# Patient Record
Sex: MALE | Race: BLACK OR AFRICAN AMERICAN | NOT HISPANIC OR LATINO | URBAN - METROPOLITAN AREA
[De-identification: names, ages, dates, MRNs, and addresses within clinical notes are randomized per-mention and may not be internally consistent; named-entity substitution may affect disease eponyms.]

---

## 2022-05-26 ENCOUNTER — INPATIENT (INPATIENT)
Facility: HOSPITAL | Age: 77
LOS: 1 days | Discharge: HOME | DRG: 252 | End: 2022-05-28
Attending: STUDENT IN AN ORGANIZED HEALTH CARE EDUCATION/TRAINING PROGRAM | Admitting: STUDENT IN AN ORGANIZED HEALTH CARE EDUCATION/TRAINING PROGRAM
Payer: MEDICARE

## 2022-05-26 VITALS
HEIGHT: 65 IN | WEIGHT: 162.04 LBS | HEART RATE: 59 BPM | SYSTOLIC BLOOD PRESSURE: 129 MMHG | OXYGEN SATURATION: 97 % | DIASTOLIC BLOOD PRESSURE: 69 MMHG | TEMPERATURE: 97 F | RESPIRATION RATE: 18 BRPM

## 2022-05-26 DIAGNOSIS — I77.0 ARTERIOVENOUS FISTULA, ACQUIRED: Chronic | ICD-10-CM

## 2022-05-26 DIAGNOSIS — Z41.9 ENCOUNTER FOR PROCEDURE FOR PURPOSES OTHER THAN REMEDYING HEALTH STATE, UNSPECIFIED: Chronic | ICD-10-CM

## 2022-05-26 LAB
ALBUMIN SERPL ELPH-MCNC: 4.9 G/DL — SIGNIFICANT CHANGE UP (ref 3.3–5)
ALP SERPL-CCNC: 99 U/L — SIGNIFICANT CHANGE UP (ref 40–120)
ALT FLD-CCNC: 9 U/L — LOW (ref 10–45)
ANION GAP SERPL CALC-SCNC: 18 MMOL/L — HIGH (ref 5–17)
ANION GAP SERPL CALC-SCNC: 19 MMOL/L — HIGH (ref 5–17)
APTT BLD: 29.8 SEC — SIGNIFICANT CHANGE UP (ref 27.5–35.5)
AST SERPL-CCNC: 15 U/L — SIGNIFICANT CHANGE UP (ref 10–40)
BASOPHILS # BLD AUTO: 0.04 K/UL — SIGNIFICANT CHANGE UP (ref 0–0.2)
BASOPHILS NFR BLD AUTO: 0.4 % — SIGNIFICANT CHANGE UP (ref 0–2)
BILIRUB SERPL-MCNC: 0.3 MG/DL — SIGNIFICANT CHANGE UP (ref 0.2–1.2)
BLD GP AB SCN SERPL QL: NEGATIVE — SIGNIFICANT CHANGE UP
BLD GP AB SCN SERPL QL: NEGATIVE — SIGNIFICANT CHANGE UP
BUN SERPL-MCNC: 40 MG/DL — HIGH (ref 7–23)
BUN SERPL-MCNC: 53 MG/DL — HIGH (ref 7–23)
CALCIUM SERPL-MCNC: 8.5 MG/DL — SIGNIFICANT CHANGE UP (ref 8.4–10.5)
CALCIUM SERPL-MCNC: 8.7 MG/DL — SIGNIFICANT CHANGE UP (ref 8.4–10.5)
CHLORIDE SERPL-SCNC: 90 MMOL/L — LOW (ref 96–108)
CHLORIDE SERPL-SCNC: 92 MMOL/L — LOW (ref 96–108)
CO2 SERPL-SCNC: 29 MMOL/L — SIGNIFICANT CHANGE UP (ref 22–31)
CO2 SERPL-SCNC: 31 MMOL/L — SIGNIFICANT CHANGE UP (ref 22–31)
CREAT SERPL-MCNC: 8.39 MG/DL — HIGH (ref 0.5–1.3)
CREAT SERPL-MCNC: 9.22 MG/DL — HIGH (ref 0.5–1.3)
EGFR: 5 ML/MIN/1.73M2 — LOW
EGFR: 6 ML/MIN/1.73M2 — LOW
EOSINOPHIL # BLD AUTO: 0.04 K/UL — SIGNIFICANT CHANGE UP (ref 0–0.5)
EOSINOPHIL NFR BLD AUTO: 0.4 % — SIGNIFICANT CHANGE UP (ref 0–6)
GLUCOSE SERPL-MCNC: 70 MG/DL — SIGNIFICANT CHANGE UP (ref 70–99)
GLUCOSE SERPL-MCNC: 87 MG/DL — SIGNIFICANT CHANGE UP (ref 70–99)
HCT VFR BLD CALC: 45.3 % — SIGNIFICANT CHANGE UP (ref 39–50)
HCT VFR BLD CALC: 45.6 % — SIGNIFICANT CHANGE UP (ref 39–50)
HGB BLD-MCNC: 14.2 G/DL — SIGNIFICANT CHANGE UP (ref 13–17)
HGB BLD-MCNC: 14.5 G/DL — SIGNIFICANT CHANGE UP (ref 13–17)
IMM GRANULOCYTES NFR BLD AUTO: 0.3 % — SIGNIFICANT CHANGE UP (ref 0–1.5)
INR BLD: 1.06 — SIGNIFICANT CHANGE UP (ref 0.88–1.16)
LYMPHOCYTES # BLD AUTO: 1.43 K/UL — SIGNIFICANT CHANGE UP (ref 1–3.3)
LYMPHOCYTES # BLD AUTO: 15.6 % — SIGNIFICANT CHANGE UP (ref 13–44)
MAGNESIUM SERPL-MCNC: 2 MG/DL — SIGNIFICANT CHANGE UP (ref 1.6–2.6)
MCHC RBC-ENTMCNC: 28 PG — SIGNIFICANT CHANGE UP (ref 27–34)
MCHC RBC-ENTMCNC: 28.3 PG — SIGNIFICANT CHANGE UP (ref 27–34)
MCHC RBC-ENTMCNC: 31.1 GM/DL — LOW (ref 32–36)
MCHC RBC-ENTMCNC: 32 GM/DL — SIGNIFICANT CHANGE UP (ref 32–36)
MCV RBC AUTO: 88.3 FL — SIGNIFICANT CHANGE UP (ref 80–100)
MCV RBC AUTO: 89.8 FL — SIGNIFICANT CHANGE UP (ref 80–100)
MONOCYTES # BLD AUTO: 0.82 K/UL — SIGNIFICANT CHANGE UP (ref 0–0.9)
MONOCYTES NFR BLD AUTO: 8.9 % — SIGNIFICANT CHANGE UP (ref 2–14)
NEUTROPHILS # BLD AUTO: 6.83 K/UL — SIGNIFICANT CHANGE UP (ref 1.8–7.4)
NEUTROPHILS NFR BLD AUTO: 74.4 % — SIGNIFICANT CHANGE UP (ref 43–77)
NRBC # BLD: 0 /100 WBCS — SIGNIFICANT CHANGE UP (ref 0–0)
NRBC # BLD: 0 /100 WBCS — SIGNIFICANT CHANGE UP (ref 0–0)
PHOSPHATE SERPL-MCNC: 4.7 MG/DL — HIGH (ref 2.5–4.5)
PLATELET # BLD AUTO: 188 K/UL — SIGNIFICANT CHANGE UP (ref 150–400)
PLATELET # BLD AUTO: 213 K/UL — SIGNIFICANT CHANGE UP (ref 150–400)
POTASSIUM SERPL-MCNC: 3.7 MMOL/L — SIGNIFICANT CHANGE UP (ref 3.5–5.3)
POTASSIUM SERPL-MCNC: 4.7 MMOL/L — SIGNIFICANT CHANGE UP (ref 3.5–5.3)
POTASSIUM SERPL-SCNC: 3.7 MMOL/L — SIGNIFICANT CHANGE UP (ref 3.5–5.3)
POTASSIUM SERPL-SCNC: 4.7 MMOL/L — SIGNIFICANT CHANGE UP (ref 3.5–5.3)
PROT SERPL-MCNC: 7.8 G/DL — SIGNIFICANT CHANGE UP (ref 6–8.3)
PROTHROM AB SERPL-ACNC: 12.6 SEC — SIGNIFICANT CHANGE UP (ref 10.5–13.4)
RBC # BLD: 5.08 M/UL — SIGNIFICANT CHANGE UP (ref 4.2–5.8)
RBC # BLD: 5.13 M/UL — SIGNIFICANT CHANGE UP (ref 4.2–5.8)
RBC # FLD: 15.9 % — HIGH (ref 10.3–14.5)
RBC # FLD: 16 % — HIGH (ref 10.3–14.5)
RH IG SCN BLD-IMP: POSITIVE — SIGNIFICANT CHANGE UP
RH IG SCN BLD-IMP: POSITIVE — SIGNIFICANT CHANGE UP
SARS-COV-2 RNA SPEC QL NAA+PROBE: NEGATIVE — SIGNIFICANT CHANGE UP
SODIUM SERPL-SCNC: 139 MMOL/L — SIGNIFICANT CHANGE UP (ref 135–145)
SODIUM SERPL-SCNC: 140 MMOL/L — SIGNIFICANT CHANGE UP (ref 135–145)
WBC # BLD: 8.88 K/UL — SIGNIFICANT CHANGE UP (ref 3.8–10.5)
WBC # BLD: 9.19 K/UL — SIGNIFICANT CHANGE UP (ref 3.8–10.5)
WBC # FLD AUTO: 8.88 K/UL — SIGNIFICANT CHANGE UP (ref 3.8–10.5)
WBC # FLD AUTO: 9.19 K/UL — SIGNIFICANT CHANGE UP (ref 3.8–10.5)

## 2022-05-26 PROCEDURE — 99285 EMERGENCY DEPT VISIT HI MDM: CPT

## 2022-05-26 PROCEDURE — 71046 X-RAY EXAM CHEST 2 VIEWS: CPT | Mod: 26

## 2022-05-26 RX ORDER — METOPROLOL TARTRATE 50 MG
1 TABLET ORAL
Qty: 0 | Refills: 0 | DISCHARGE

## 2022-05-26 RX ORDER — PREGABALIN 225 MG/1
1 CAPSULE ORAL
Qty: 0 | Refills: 0 | DISCHARGE

## 2022-05-26 RX ORDER — NIFEDIPINE 30 MG
90 TABLET, EXTENDED RELEASE 24 HR ORAL DAILY
Refills: 0 | Status: DISCONTINUED | OUTPATIENT
Start: 2022-05-26 | End: 2022-05-27

## 2022-05-26 RX ORDER — FOLIC ACID 0.8 MG
1 TABLET ORAL DAILY
Refills: 0 | Status: DISCONTINUED | OUTPATIENT
Start: 2022-05-26 | End: 2022-05-27

## 2022-05-26 RX ORDER — PREGABALIN 225 MG/1
1000 CAPSULE ORAL DAILY
Refills: 0 | Status: DISCONTINUED | OUTPATIENT
Start: 2022-05-26 | End: 2022-05-27

## 2022-05-26 RX ORDER — HEPARIN SODIUM 5000 [USP'U]/ML
5000 INJECTION INTRAVENOUS; SUBCUTANEOUS EVERY 12 HOURS
Refills: 0 | Status: DISCONTINUED | OUTPATIENT
Start: 2022-05-26 | End: 2022-05-27

## 2022-05-26 RX ORDER — METOPROLOL TARTRATE 50 MG
50 TABLET ORAL DAILY
Refills: 0 | Status: DISCONTINUED | OUTPATIENT
Start: 2022-05-26 | End: 2022-05-27

## 2022-05-26 RX ORDER — CALCIUM ACETATE 667 MG
2 TABLET ORAL
Qty: 0 | Refills: 0 | DISCHARGE

## 2022-05-26 RX ORDER — OLMESARTAN MEDOXOMIL 5 MG/1
1 TABLET, FILM COATED ORAL
Qty: 0 | Refills: 0 | DISCHARGE

## 2022-05-26 RX ORDER — APIXABAN 2.5 MG/1
1 TABLET, FILM COATED ORAL
Qty: 0 | Refills: 0 | DISCHARGE

## 2022-05-26 RX ORDER — ATORVASTATIN CALCIUM 80 MG/1
1 TABLET, FILM COATED ORAL
Qty: 0 | Refills: 0 | DISCHARGE

## 2022-05-26 RX ORDER — FLUTICASONE FUROATE, UMECLIDINIUM BROMIDE AND VILANTEROL TRIFENATATE 200; 62.5; 25 UG/1; UG/1; UG/1
1 POWDER RESPIRATORY (INHALATION)
Qty: 0 | Refills: 0 | DISCHARGE

## 2022-05-26 RX ORDER — CALCIUM ACETATE 667 MG
1334 TABLET ORAL
Refills: 0 | Status: DISCONTINUED | OUTPATIENT
Start: 2022-05-26 | End: 2022-05-27

## 2022-05-26 RX ORDER — ASPIRIN/CALCIUM CARB/MAGNESIUM 324 MG
81 TABLET ORAL DAILY
Refills: 0 | Status: DISCONTINUED | OUTPATIENT
Start: 2022-05-26 | End: 2022-05-27

## 2022-05-26 RX ORDER — HEPARIN SODIUM 5000 [USP'U]/ML
5000 INJECTION INTRAVENOUS; SUBCUTANEOUS EVERY 8 HOURS
Refills: 0 | Status: DISCONTINUED | OUTPATIENT
Start: 2022-05-26 | End: 2022-05-26

## 2022-05-26 RX ORDER — FOLIC ACID 0.8 MG
1 TABLET ORAL
Qty: 0 | Refills: 0 | DISCHARGE

## 2022-05-26 RX ORDER — BUDESONIDE AND FORMOTEROL FUMARATE DIHYDRATE 160; 4.5 UG/1; UG/1
2 AEROSOL RESPIRATORY (INHALATION)
Refills: 0 | Status: DISCONTINUED | OUTPATIENT
Start: 2022-05-26 | End: 2022-05-27

## 2022-05-26 RX ORDER — ATORVASTATIN CALCIUM 80 MG/1
20 TABLET, FILM COATED ORAL AT BEDTIME
Refills: 0 | Status: DISCONTINUED | OUTPATIENT
Start: 2022-05-26 | End: 2022-05-27

## 2022-05-26 RX ORDER — TIOTROPIUM BROMIDE 18 UG/1
1 CAPSULE ORAL; RESPIRATORY (INHALATION) DAILY
Refills: 0 | Status: DISCONTINUED | OUTPATIENT
Start: 2022-05-26 | End: 2022-05-27

## 2022-05-26 RX ADMIN — HEPARIN SODIUM 5000 UNIT(S): 5000 INJECTION INTRAVENOUS; SUBCUTANEOUS at 19:54

## 2022-05-26 RX ADMIN — Medication 1334 MILLIGRAM(S): at 17:29

## 2022-05-26 RX ADMIN — ATORVASTATIN CALCIUM 20 MILLIGRAM(S): 80 TABLET, FILM COATED ORAL at 22:14

## 2022-05-26 NOTE — ED PROVIDER NOTE - OBJECTIVE STATEMENT
77 yom sent here from vascular for admission.  noted by Dr walker to have an ulceration in his left AVF.  Pt completed dialysis yesterday.  no current complaints

## 2022-05-26 NOTE — H&P ADULT - NSICDXPASTSURGICALHX_GEN_ALL_CORE_FT
PAST SURGICAL HISTORY:  Elective surgery right eye surgery    Left femoral arteriovenous fistula

## 2022-05-26 NOTE — ED ADULT NURSE NOTE - OBJECTIVE STATEMENT
77y male sent in for vascular admission. MWF dialysis. received full session yesterday. states that his doctor sent him in for L fistula repair because of ulceration on L fistula. open skin noted to L forearm. 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 no other complaints. denies fever/chills, n/v/d, headache, numbness/tingling, CP. 20G R ac placed. preop labs sent.

## 2022-05-26 NOTE — H&P ADULT - NSHPLABSRESULTS_GEN_ALL_CORE
14.5   9.19  )-----------( 213      ( 26 May 2022 16:02 )             45.3   05-26    140  |  90<L>  |  40<H>  ----------------------------<  70  3.7   |  31  |  8.39<H>    Ca    8.7      26 May 2022 16:02    TPro  7.8  /  Alb  4.9  /  TBili  0.3  /  DBili  x   /  AST  15  /  ALT  9<L>  /  AlkPhos  99  05-26

## 2022-05-26 NOTE — H&P ADULT - ASSESSMENT
77M w/ CAD/MI s/p PPM, ESRD on MWF HD via LUE AVF who presents from Dr. Stauffer's office for LUE AVF revision w/ Dr. Oreilly.    Vascular/PAD:  Home Atorva, ASA  Hold Eliquis    HTN/HLD:  Cont Metop, nifedipine  Hold Olmesarta    CAD/CHF:  Cont ASA    CKD:   HD MWF via LUE AVF    ID:  Vanc and Gent w/ HD    Diet:  Renal diet  NPO@MN    Activity: As tolerated    DVTPPx:   SQH    Dispo: pending OR for LUE AVF revision tomorrow

## 2022-05-26 NOTE — H&P ADULT - NSICDXPASTMEDICALHX_GEN_ALL_CORE_FT
PAST MEDICAL HISTORY:  CAD (coronary artery disease)     Cataract     End-stage renal disease (ESRD)     ESRD on hemodialysis     NSTEMI (non-ST elevation myocardial infarction)

## 2022-05-26 NOTE — PATIENT PROFILE ADULT - BRAND OF COVID-19 VACCINATION
unable to remember dates. covid vaccine card in wallet taken home by caregiver/Pfizer dose 1, 2, and 3

## 2022-05-26 NOTE — H&P ADULT - NSHPPHYSICALEXAM_GEN_ALL_CORE
GEN: NAD, resting comfortably in bed  HEENT: MMM  CV: RRR  Resp: nonlabored breathing, no respiratory distress  Abd: soft, nontender, nondistended  Ext: WWP, no edema, no calf tenderness  LUE: 2 pseudoaneurysms noted distal 2cm and proximal 1cm, ulceration noted over distal aneurysm  Neuro: no focal deficits  Psych: pleasant

## 2022-05-26 NOTE — H&P ADULT - HISTORY OF PRESENT ILLNESS
77M w/ PMHx CAD/MI s/p cath 2018 s/p PPM 4/22, 1st AV block, sinus marge, pAFlutter, HTN, HLD, Pulm HTN, ESRD on MWF HD, gout, venous insufficiency, COPD, scoliosis, R cataract surgery, LUE AVF sent in from Dr. Stauffer's office for AVF ulceration today. Pt is in his usual state of health and notes he had a regular session of dialysis yesterday. He went to his regular office visit w/ Dr. 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 Denies f/c. Denies LUE swelling, n/t/w. Denies arm pain or discomfort. Still feels pulse and thrill in AVF.    In the ED pt was afebrile with stable vital signs. WBC was 9.2, Hgb 14.5 BUN/Cr was 40/8.4. K was 3.7.

## 2022-05-26 NOTE — ED PROVIDER NOTE - CPE EDP PSYCH NORM
-- Message is from the [facility]--    Reason for Call: Patient saw Dr Paz on 8/23/19  Would like to know if an carotid duplex scan , no order in the system . Patient would like to speak with nurse.         Alternative phone number: [phone]    Turnaround time given to caller:   This message will be sent to [state Provider's name]. The clinical team will fulfill your request as soon as they review your message. Please be aware that you can also contact your physician by entering your message in [email or web address], our online portal.   normal...

## 2022-05-26 NOTE — ED ADULT TRIAGE NOTE - CHIEF COMPLAINT QUOTE
Pt referred to ED by MD Stauffer for admission to repair left AV fistula. Ulceration noted to left forearm. Pt denies pain/fever/chills.

## 2022-05-27 DIAGNOSIS — T82.9XXA UNSPECIFIED COMPLICATION OF CARDIAC AND VASCULAR PROSTHETIC DEVICE, IMPLANT AND GRAFT, INITIAL ENCOUNTER: ICD-10-CM

## 2022-05-27 DIAGNOSIS — I10 ESSENTIAL (PRIMARY) HYPERTENSION: ICD-10-CM

## 2022-05-27 DIAGNOSIS — N18.6 END STAGE RENAL DISEASE: ICD-10-CM

## 2022-05-27 DIAGNOSIS — I25.10 ATHEROSCLEROTIC HEART DISEASE OF NATIVE CORONARY ARTERY WITHOUT ANGINA PECTORIS: ICD-10-CM

## 2022-05-27 LAB
GLUCOSE BLDC GLUCOMTR-MCNC: 126 MG/DL — HIGH (ref 70–99)
GLUCOSE BLDC GLUCOMTR-MCNC: 95 MG/DL — SIGNIFICANT CHANGE UP (ref 70–99)

## 2022-05-27 PROCEDURE — 99221 1ST HOSP IP/OBS SF/LOW 40: CPT

## 2022-05-27 PROCEDURE — 99232 SBSQ HOSP IP/OBS MODERATE 35: CPT

## 2022-05-27 PROCEDURE — 36832 AV FISTULA REVISION OPEN: CPT | Mod: GC

## 2022-05-27 PROCEDURE — 99223 1ST HOSP IP/OBS HIGH 75: CPT

## 2022-05-27 DEVICE — CLIP APPLIER ETHICON LIGACLIP 9 3/8" SMALL: Type: IMPLANTABLE DEVICE | Status: FUNCTIONAL

## 2022-05-27 DEVICE — SURGICEL 4 X 8": Type: IMPLANTABLE DEVICE | Status: FUNCTIONAL

## 2022-05-27 DEVICE — SURGICEL NU-KNIT 3 X 4": Type: IMPLANTABLE DEVICE | Status: FUNCTIONAL

## 2022-05-27 DEVICE — CLIP APPLIER ETHICON LIGACLIP 11.5" MEDIUM: Type: IMPLANTABLE DEVICE | Status: FUNCTIONAL

## 2022-05-27 DEVICE — GRAFT VASC ACUSEAL 7MMX45CM: Type: IMPLANTABLE DEVICE | Status: FUNCTIONAL

## 2022-05-27 RX ORDER — NIFEDIPINE 30 MG
90 TABLET, EXTENDED RELEASE 24 HR ORAL DAILY
Refills: 0 | Status: DISCONTINUED | OUTPATIENT
Start: 2022-05-27 | End: 2022-05-28

## 2022-05-27 RX ORDER — GENTAMICIN SULFATE 40 MG/ML
200 VIAL (ML) INJECTION ONCE
Refills: 0 | Status: COMPLETED | OUTPATIENT
Start: 2022-05-27 | End: 2022-05-27

## 2022-05-27 RX ORDER — TIOTROPIUM BROMIDE 18 UG/1
1 CAPSULE ORAL; RESPIRATORY (INHALATION) DAILY
Refills: 0 | Status: DISCONTINUED | OUTPATIENT
Start: 2022-05-27 | End: 2022-05-28

## 2022-05-27 RX ORDER — FOLIC ACID 0.8 MG
1 TABLET ORAL DAILY
Refills: 0 | Status: DISCONTINUED | OUTPATIENT
Start: 2022-05-27 | End: 2022-05-28

## 2022-05-27 RX ORDER — VANCOMYCIN HCL 1 G
500 VIAL (EA) INTRAVENOUS ONCE
Refills: 0 | Status: COMPLETED | OUTPATIENT
Start: 2022-05-27 | End: 2022-05-27

## 2022-05-27 RX ORDER — HEPARIN SODIUM 5000 [USP'U]/ML
5000 INJECTION INTRAVENOUS; SUBCUTANEOUS EVERY 12 HOURS
Refills: 0 | Status: DISCONTINUED | OUTPATIENT
Start: 2022-05-27 | End: 2022-05-28

## 2022-05-27 RX ORDER — PREGABALIN 225 MG/1
1000 CAPSULE ORAL DAILY
Refills: 0 | Status: DISCONTINUED | OUTPATIENT
Start: 2022-05-27 | End: 2022-05-28

## 2022-05-27 RX ORDER — METOPROLOL TARTRATE 50 MG
50 TABLET ORAL DAILY
Refills: 0 | Status: DISCONTINUED | OUTPATIENT
Start: 2022-05-27 | End: 2022-05-28

## 2022-05-27 RX ORDER — VANCOMYCIN HCL 1 G
500 VIAL (EA) INTRAVENOUS
Refills: 0 | Status: DISCONTINUED | OUTPATIENT
Start: 2022-05-27 | End: 2022-05-28

## 2022-05-27 RX ORDER — VANCOMYCIN HCL 1 G
500 VIAL (EA) INTRAVENOUS
Qty: 500 | Refills: 0
Start: 2022-05-27 | End: 2022-06-09

## 2022-05-27 RX ORDER — GENTAMICIN SULFATE 40 MG/ML
75 VIAL (ML) INJECTION
Refills: 0 | Status: DISCONTINUED | OUTPATIENT
Start: 2022-05-27 | End: 2022-05-28

## 2022-05-27 RX ORDER — GENTAMICIN SULFATE 40 MG/ML
1 VIAL (ML) INJECTION
Qty: 74 | Refills: 0
Start: 2022-05-27 | End: 2022-06-09

## 2022-05-27 RX ORDER — CALCIUM ACETATE 667 MG
1334 TABLET ORAL
Refills: 0 | Status: DISCONTINUED | OUTPATIENT
Start: 2022-05-27 | End: 2022-05-28

## 2022-05-27 RX ORDER — BUDESONIDE AND FORMOTEROL FUMARATE DIHYDRATE 160; 4.5 UG/1; UG/1
2 AEROSOL RESPIRATORY (INHALATION)
Refills: 0 | Status: DISCONTINUED | OUTPATIENT
Start: 2022-05-27 | End: 2022-05-28

## 2022-05-27 RX ORDER — ASPIRIN/CALCIUM CARB/MAGNESIUM 324 MG
81 TABLET ORAL DAILY
Refills: 0 | Status: DISCONTINUED | OUTPATIENT
Start: 2022-05-27 | End: 2022-05-28

## 2022-05-27 RX ORDER — ATORVASTATIN CALCIUM 80 MG/1
20 TABLET, FILM COATED ORAL AT BEDTIME
Refills: 0 | Status: DISCONTINUED | OUTPATIENT
Start: 2022-05-27 | End: 2022-05-28

## 2022-05-27 RX ADMIN — ATORVASTATIN CALCIUM 20 MILLIGRAM(S): 80 TABLET, FILM COATED ORAL at 21:52

## 2022-05-27 RX ADMIN — Medication 1334 MILLIGRAM(S): at 18:01

## 2022-05-27 RX ADMIN — HEPARIN SODIUM 5000 UNIT(S): 5000 INJECTION INTRAVENOUS; SUBCUTANEOUS at 18:02

## 2022-05-27 RX ADMIN — Medication 90 MILLIGRAM(S): at 05:41

## 2022-05-27 RX ADMIN — Medication 50 MILLIGRAM(S): at 05:41

## 2022-05-27 RX ADMIN — Medication 81 MILLIGRAM(S): at 18:01

## 2022-05-27 RX ADMIN — Medication 200 MILLIGRAM(S): at 19:21

## 2022-05-27 RX ADMIN — HEPARIN SODIUM 5000 UNIT(S): 5000 INJECTION INTRAVENOUS; SUBCUTANEOUS at 05:41

## 2022-05-27 RX ADMIN — Medication 1 MILLIGRAM(S): at 18:01

## 2022-05-27 RX ADMIN — Medication 100 MILLIGRAM(S): at 20:09

## 2022-05-27 RX ADMIN — PREGABALIN 1000 MICROGRAM(S): 225 CAPSULE ORAL at 18:02

## 2022-05-27 RX ADMIN — Medication 50 MILLIGRAM(S): at 18:02

## 2022-05-27 NOTE — PROGRESS NOTE ADULT - SUBJECTIVE AND OBJECTIVE BOX
O/N: STERLING, VSS       K4.7                                                      77M w/ CAD/MI s/p PPM, ESRD on MWF HD via LUE AVF who presents from Dr. Stauffer's office for LUE AVF revision w/ Dr. Oreilly.    Vascular/PAD:  Home Atorva, ASA  Hold Eliquis    HTN/HLD:  Cont Metop, nifedipine  Hold Olmesarta    CAD/CHF:  Cont ASA    CKD:   HD MWF via LUE AVF    ID:  Phuong and Gent w/ HD    Diet:  Renal diet  NPO@MN    Activity: As tolerated    DVTPPx:   SQH    Dispo: pending OR for LUE AVF revision tomorrow       O/N: STERLING, VSS       K4.7        S: Patient does not have any complaints    O: Examined in bed resting comfortably     ROS: Denies headache, blurred vision, chest pain, SOB, abdominal pain, nausea or vomiting.         aspirin enteric coated 81  heparin   Injectable 5000  metoprolol succinate ER 50  NIFEdipine XL 90      Allergies    Seafood (Other)  Vasotec (Other)    Intolerances        Vital Signs Last 24 Hrs  T(C): 36.1 (27 May 2022 06:05), Max: 36.8 (26 May 2022 19:32)  T(F): 97 (27 May 2022 06:05), Max: 98.3 (26 May 2022 19:32)  HR: 62 (27 May 2022 05:25) (59 - 63)  BP: 157/77 (27 May 2022 05:25) (118/67 - 157/77)  BP(mean): 107 (27 May 2022 05:25) (88 - 107)  RR: 18 (27 May 2022 05:25) (18 - 18)  SpO2: 95% (27 May 2022 05:25) (93% - 97%)  I&O's Summary    GEN: NAD, resting comfortably in bed  HEENT: MMM  CV: RRR  Resp: nonlabored breathing, no respiratory distress  Abd: soft, nontender, nondistended  Ext: WWP, no edema, no calf tenderness  LUE: 2 pseudoaneurysms noted distal 2cm and proximal 1cm, ulceration noted over distal aneurysm, no active drainage/bleeding  Neuro: no focal deficits  Psych: pleasan    LABS:                        14.2   8.88  )-----------( 188      ( 26 May 2022 22:42 )             45.6     05-26    139  |  92<L>  |  53<H>  ----------------------------<  87  4.7   |  29  |  9.22<H>    Ca    8.5      26 May 2022 22:42  Phos  4.7     05-26  Mg     2.0     05-26    TPro  7.8  /  Alb  4.9  /  TBili  0.3  /  DBili  x   /  AST  15  /  ALT  9<L>  /  AlkPhos  99  05-26    PT/INR - ( 26 May 2022 16:02 )   PT: 12.6 sec;   INR: 1.06          PTT - ( 26 May 2022 16:02 )  PTT:29.8 sec      ---------------------------------------------------------------------------  PLEASE CHECK WHEN PRESENT:  [  ] Heart Failure  [  ] Acute  [  ] Acute on Chronic  [  ] Chronic  -------------------------------------------------------------------  [  ]Diastolic [HFpEF]  [  ]Systolic [HFrEF]  [  ]Combined [HFpEF & HFrEF]  [ x] aflutter  [x  ] hypertensive heart disease  [ x]Other: 1st degree AVB  -------------------------------------------------------------------  [ ] Respiratory failure  [ ] Acute cor pulmonale  [ ] Asthma/COPD Exacerbation  [ ] Pleural effusion  [ ] Aspiration pneumonia  -------------------------------------------------------------------  [  ]ZAMZAM  [  ]ATN  [  ]Reneal Medullary Necrosis  [  ]Renal Cortical Necrosis  [  ]Other Pathological Lesions:    [  ]CKD 1  [  ]CKD 2  [  ]CKD 3  [  ]CKD 4  [ x]CKD 5  [  ]Other  -------------------------------------------------------------------  [  ]Diabetes  [  ] Diabetic PVD Ulcer  [  ] Neuropathic ulcer to DM  [  ] Diabetes with Nephropathy  [  ] Osteomyelitis due to diabetes  --------------------------------------------------------------------  [  ]Malnutrition: See Nutrition Note  [  ]Cachexia  [  ]Other:   [  ]Supplement Ordered:  [  ]Morbid Obesity (BMI >=40]  ---------------------------------------------------------------------  [ ] Sepsis/severe sepsis/septic shock  [ ] UTI  [ ] Pneumonia  [x] COPD  [x] Pulmonary hypertension   -----------------------------------------------------------------------  [ ] Acidosis/alkalosis  [ ] Fluid overload  [ ] Hypokalemia  [ ] Hyperkalemia  [ ] Hypomagnesemia  [ ] Hypophosphatemia  [ ] Hyperphosphatemia  ------------------------------------------------------------------------  [ ] Acute blood loss anemia  [ ] Post op blood loss anemia  [ ] Iron deficiency anemia  [ ] Anemia due to chronic disease  [ ] Hypercoagulable state  ----------------------------------------------------------------------  [ ] Cerebral infarction  [ ] Transient ischemia attack  [ ] Encephalopathy      77M w/ CAD/MI s/p PPM, ESRD on MWF HD via LUE AVF who presents from Dr. Stauffer's office for LUE AVF revision w/ Dr. Oreilly.    Vascular/PAD:  Home Atorvastatin, ASA  Holding Eliquis erasmo-op    HTN/HLD:  Cont Metop, nifedipine  Holding Olmesarta    CAD/CHF:  Cont ASA    CKD:   HD MWF via LUE AVF  Renal consult    ID:  [facility] w/ HD    Diet:  Renal diet  NPO@MN    Activity: As tolerated    DVTPPx:   SQH    Dispo: pending OR for LUE AVF revision today    I have personally seen and examined the patient. I have reviewed all pertinent imaging and laboratory findings.

## 2022-05-27 NOTE — BRIEF OPERATIVE NOTE - OPERATION/FINDINGS
L arm was prepped and draped. 2 cm transverse incision made over proximal outflow tract. 3cm longitudinal incision made over distal outflow tract. Outflow tract L arm was prepped and draped. 2 cm transverse incision made over proximal outflow tract. 3cm longitudinal incision made over distal outflow tract. Proximal and distal control was obtained and vein was ligated and oversewn. PTFE graft was tunneled laterally and anastamosed with 5-0 prolene. Distal end was unclamped with good back bleeding and good thrill was confirmed with hemostasis. Subcutaneous tissue was closed with 3-0 vicryl. Skin was closed with 4-0 monocryl. Telfa and tegaderm.

## 2022-05-27 NOTE — CONSULT NOTE ADULT - ATTENDING COMMENTS
I agree with the fellow's findings and plans as written above with the following additions/amendments:    Seen and examined at bedside. Discussed plan for HD outside of ulcer area after intervention, patient amenable. No acute indications for HD at this time, monitoring closely. Otherwise feeling well, no particular complaints, further recs as above.

## 2022-05-27 NOTE — CONSULT NOTE ADULT - ASSESSMENT
77M PMHx CAD/MI s/p cath 2018 s/p PPM 4/22, 1st AV block, sinus marge, pAFlutter, HTN, HLD, Pulm HTN, ESRD on MWF HD, gout, venous insufficiency, COPD, scoliosis, R cataract surgery, LUE AVF sent in from Dr. Stauffer's office for AVF ulceration. Nephrology consulted for dialysis.     Assessment/Plan:   #ESRD on HD MWF @[facility]   usual Rx 3.5h unknown UF or eDW   last hemodialysis 5/25 per schedule   no acute indication for hemodialysis   next hemodialysis 5/27 per schedule post OR   electrolytes at goal   euvolemic, UF w/HD   dry weight TBD     #HTN   BP at goal   continue home meds     #access   LUE AVF pending intervention 5/27     #anemia  Hb at goal   no indication for EPO or IV Iron at this time     #renal bone disease   acceptable     Thank you for the opportunity to participate in the care of your patient. The nephrology service remains available to assist with any questions or concerns. Please feel free to reach us by paging the on-call nephrology fellow for urgent issues or as below.     Miguel Angel Jara M.D.   PGY-5, Nephrology Fellow   C: [phone]   P: [phone] 
78 y/o M w/

## 2022-05-27 NOTE — PROGRESS NOTE ADULT - SUBJECTIVE AND OBJECTIVE BOX
Vascular Surgery Post-Op Note    Procedure: s/p ? AVF revision     Diagnosis/Indication: ulcer    Surgeon: Dr. Oreilly     S: Pt has no complaints. Denies CP, SOB, FISHER, calf tenderness. Pain controlled with medication.    O:  T(C): --  T(F): --  HR: 60 (05-27-22 @ 17:53) (60 - 60)  BP: 141/63 (05-27-22 @ 17:53) (141/63 - 141/63)  RR: 17 (05-27-22 @ 17:53) (17 - 17)  SpO2: 95% (05-27-22 @ 17:53) (95% - 95%)  Wt(kg): --                        14.2   8.88  )-----------( 188      ( 26 May 2022 22:42 )             45.6     05-26    139  |  92<L>  |  53<H>  ----------------------------<  87  4.7   |  29  |  9.22<H>    Ca    8.5      26 May 2022 22:42  Phos  4.7     05-26  Mg     2.0     05-26    TPro  7.8  /  Alb  4.9  /  TBili  0.3  /  DBili  x   /  AST  15  /  ALT  9<L>  /  AlkPhos  99  05-26      Gen: NAD, resting comfortably in bed  C/V: NSR  Pulm: Nonlabored breathing, no respiratory distress  Abd: soft, NT/ND  Extrem: dressing mild saturation, C/D/I, + thrill nd pulse. good motor sensory function.       A/P: 77yMale s/p above procedure  Diet: renal   IVF: n/a  Pain/nausea control  DVT ppx: sqh  Dispo plan: 5 uris

## 2022-05-27 NOTE — BRIEF OPERATIVE NOTE - NSICDXBRIEFPOSTOP_GEN_ALL_CORE_FT
POST-OP DIAGNOSIS:  Skin ulcer of forearm 27-May-2022 15:02:19 Overlying AVF w/ risk of blowout Gamaliel Desai

## 2022-05-27 NOTE — BRIEF OPERATIVE NOTE - NSICDXBRIEFPROCEDURE_GEN_ALL_CORE_FT
PROCEDURES:  Revision, dialysis AV fistula 27-May-2022 14:59:48  Gamaliel Desai  AV graft 27-May-2022 15:00:00  Gamaliel Desai

## 2022-05-27 NOTE — CONSULT NOTE ADULT - SUBJECTIVE AND OBJECTIVE BOX
Patient is a 77y old  Male who presents with a chief complaint of     HPI:  77M PMHx CAD/MI s/p cath 2018 s/p PPM 4/22, 1st AV block, sinus marge, pAFlutter, HTN, HLD, Pulm HTN, ESRD on MWF HD, gout, venous insufficiency, COPD, scoliosis, R cataract surgery, LUE AVF sent in from Dr. Stauffer's office for AVF ulceration. Pt is in his usual state of health and notes he had a regular session of dialysis Wednesday, well-tolerated, no bleeding or access/cannulation issues. Denies f/c. Denies LUE swelling. Denies arm pain or discomfort. Nephrology consulted for dialysis.     PAST MEDICAL & SURGICAL HISTORY:  End-stage renal disease (ESRD)      Cataract      CAD (coronary artery disease)      NSTEMI (non-ST elevation myocardial infarction)      ESRD on hemodialysis      Elective surgery  right eye surgery      Left femoral arteriovenous fistula            Allergies:  Seafood (Other)  Vasotec (Other)      Home Medications:   aspirin enteric coated 81 milliGRAM(s) Oral daily  atorvastatin 20 milliGRAM(s) Oral at bedtime  budesonide  80 MICROgram(s)/formoterol 4.5 MICROgram(s) Inhaler 2 Puff(s) Inhalation two times a day  calcium acetate 1334 milliGRAM(s) Oral two times a day with meals  cyanocobalamin 1000 MICROGram(s) Oral daily  folic acid 1 milliGRAM(s) Oral daily  heparin   Injectable 5000 Unit(s) SubCutaneous every 12 hours  metoprolol succinate ER 50 milliGRAM(s) Oral daily  NIFEdipine XL 90 milliGRAM(s) Oral daily  silver sulfADIAZINE 1% Cream 1 Application(s) Topical daily  tiotropium 18 MICROgram(s) Capsule 1 Capsule(s) Inhalation daily      Hospital Medications:   MEDICATIONS  (STANDING):  aspirin enteric coated 81 milliGRAM(s) Oral daily  atorvastatin 20 milliGRAM(s) Oral at bedtime  budesonide  80 MICROgram(s)/formoterol 4.5 MICROgram(s) Inhaler 2 Puff(s) Inhalation two times a day  calcium acetate 1334 milliGRAM(s) Oral two times a day with meals  cyanocobalamin 1000 MICROGram(s) Oral daily  folic acid 1 milliGRAM(s) Oral daily  heparin   Injectable 5000 Unit(s) SubCutaneous every 12 hours  metoprolol succinate ER 50 milliGRAM(s) Oral daily  NIFEdipine XL 90 milliGRAM(s) Oral daily  silver sulfADIAZINE 1% Cream 1 Application(s) Topical daily  tiotropium 18 MICROgram(s) Capsule 1 Capsule(s) Inhalation daily      SOCIAL HISTORY:  Denies ETOh, Smoking,     Family History:  FAMILY HISTORY:        VITALS:  T(F): 97.3 (05-27-22 @ 10:25), Max: 98.3 (05-26-22 @ 19:32)  HR: 62 (05-27-22 @ 05:25)  BP: 157/77 (05-27-22 @ 05:25)  RR: 18 (05-27-22 @ 05:25)  SpO2: 95% (05-27-22 @ 05:25)  Wt(kg): --    05-27 @ 07:01  -  05-27 @ 11:28  --------------------------------------------------------  IN: 0 mL / OUT: 0 mL / NET: 0 mL      Height (cm): 165.1 (05-26 @ 15:33)  Weight (kg): 73.5 (05-26 @ 15:33)  BMI (kg/m2): 27 (05-26 @ 15:33)  BSA (m2): 1.81 (05-26 @ 15:33)  CAPILLARY BLOOD GLUCOSE          Review of Systems:  10 point ROS negative except as above     PHYSICAL EXAM:  GENERAL: Alert, awake, oriented x3 on RA   HEENT: JAKY, EOMI, neck supple, no JVP  CHEST/LUNG: Bilateral clear breath sounds  HEART: Regular rate and rhythm, no murmur, no gallops, no rub   ABDOMEN: Soft, nontender, non distended  : No flank or supra pubic tenderness.  EXTREMITIES: no pedal edema  Neurology: AAOx3, no asterixis   SKIN: No rash or skin lesion   ACCESS: LUE AVF w/bruit and thrill; +likely pseudoaneurysm x2; +ulceration to distal likely pseudoaneurysm     LABS:  05-26    139  |  92<L>  |  53<H>  ----------------------------<  87  4.7   |  29  |  9.22<H>    Ca    8.5      26 May 2022 22:42  Phos  4.7     05-26  Mg     2.0     05-26    TPro  7.8  /  Alb  4.9  /  TBili  0.3  /  DBili      /  AST  15  /  ALT  9<L>  /  AlkPhos  99  05-26    Creatinine Trend: 9.22 <--, 8.39 <--                        14.2   8.88  )-----------( 188      ( 26 May 2022 22:42 )             45.6     Urine Studies:             Patient is a 77y old  Male who presents with a chief complaint of     HPI:  77M PMHx CAD/MI s/p cath 2018 s/p PPM 4/22, 1st AV block, sinus marge, pAFlutter, HTN, HLD, Pulm HTN, ESRD on MWF HD, gout, venous insufficiency, COPD, scoliosis, R cataract surgery, LUE AVF sent in from Dr. Stauffer's office for AVF ulceration. Pt is in his usual state of health and notes he had a regular session of dialysis Wednesday, well-tolerated, no bleeding or access/cannulation issues. Denies f/c. Denies LUE swelling. Denies arm pain or discomfort. Nephrology consulted for dialysis.     PAST MEDICAL & SURGICAL HISTORY:  End-stage renal disease (ESRD)      Cataract      CAD (coronary artery disease)      NSTEMI (non-ST elevation myocardial infarction)      ESRD on hemodialysis      Elective surgery  right eye surgery      Left femoral arteriovenous fistula            Allergies:  Seafood (Other)  Vasotec (Other)      Home Medications:   aspirin enteric coated 81 milliGRAM(s) Oral daily  atorvastatin 20 milliGRAM(s) Oral at bedtime  budesonide  80 MICROgram(s)/formoterol 4.5 MICROgram(s) Inhaler 2 Puff(s) Inhalation two times a day  calcium acetate 1334 milliGRAM(s) Oral two times a day with meals  cyanocobalamin 1000 MICROGram(s) Oral daily  folic acid 1 milliGRAM(s) Oral daily  heparin   Injectable 5000 Unit(s) SubCutaneous every 12 hours  metoprolol succinate ER 50 milliGRAM(s) Oral daily  NIFEdipine XL 90 milliGRAM(s) Oral daily  silver sulfADIAZINE 1% Cream 1 Application(s) Topical daily  tiotropium 18 MICROgram(s) Capsule 1 Capsule(s) Inhalation daily      Hospital Medications:   MEDICATIONS  (STANDING):  aspirin enteric coated 81 milliGRAM(s) Oral daily  atorvastatin 20 milliGRAM(s) Oral at bedtime  budesonide  80 MICROgram(s)/formoterol 4.5 MICROgram(s) Inhaler 2 Puff(s) Inhalation two times a day  calcium acetate 1334 milliGRAM(s) Oral two times a day with meals  cyanocobalamin 1000 MICROGram(s) Oral daily  folic acid 1 milliGRAM(s) Oral daily  heparin   Injectable 5000 Unit(s) SubCutaneous every 12 hours  metoprolol succinate ER 50 milliGRAM(s) Oral daily  NIFEdipine XL 90 milliGRAM(s) Oral daily  silver sulfADIAZINE 1% Cream 1 Application(s) Topical daily  tiotropium 18 MICROgram(s) Capsule 1 Capsule(s) Inhalation daily      SOCIAL HISTORY:  Denies ETOh, Smoking,     Family History:  FAMILY HISTORY:  Noncontributory to current admission      VITALS:  T(F): 97.3 (05-27-22 @ 10:25), Max: 98.3 (05-26-22 @ 19:32)  HR: 62 (05-27-22 @ 05:25)  BP: 157/77 (05-27-22 @ 05:25)  RR: 18 (05-27-22 @ 05:25)  SpO2: 95% (05-27-22 @ 05:25)  Wt(kg): --    05-27 @ 07:01  -  05-27 @ 11:28  --------------------------------------------------------  IN: 0 mL / OUT: 0 mL / NET: 0 mL      Height (cm): 165.1 (05-26 @ 15:33)  Weight (kg): 73.5 (05-26 @ 15:33)  BMI (kg/m2): 27 (05-26 @ 15:33)  BSA (m2): 1.81 (05-26 @ 15:33)  CAPILLARY BLOOD GLUCOSE          Review of Systems:  10 point ROS negative except as above     PHYSICAL EXAM:  GENERAL: Alert, awake, oriented x3 on RA   HEENT: JAKY, EOMI, neck supple, no JVP  CHEST/LUNG: Bilateral clear breath sounds  HEART: Regular rate and rhythm, no murmur, no gallops, no rub   ABDOMEN: Soft, nontender, non distended  : No flank or supra pubic tenderness.  EXTREMITIES: no pedal edema  Neurology: AAOx3, no asterixis   SKIN: No rash or skin lesion   ACCESS: LUE AVF w/bruit and thrill; +likely pseudoaneurysm x2; +ulceration to distal likely pseudoaneurysm     LABS:  05-26    139  |  92<L>  |  53<H>  ----------------------------<  87  4.7   |  29  |  9.22<H>    Ca    8.5      26 May 2022 22:42  Phos  4.7     05-26  Mg     2.0     05-26    TPro  7.8  /  Alb  4.9  /  TBili  0.3  /  DBili      /  AST  15  /  ALT  9<L>  /  AlkPhos  99  05-26    Creatinine Trend: 9.22 <--, 8.39 <--                        14.2   8.88  )-----------( 188      ( 26 May 2022 22:42 )             45.6     Urine Studies:

## 2022-05-27 NOTE — BRIEF OPERATIVE NOTE - NSICDXBRIEFPREOP_GEN_ALL_CORE_FT
PRE-OP DIAGNOSIS:  Skin ulcer of forearm, limited to breakdown of skin 27-May-2022 15:01:36 Overlying AVF w/ risk of blowout Gamaliel Desai

## 2022-05-28 VITALS
DIASTOLIC BLOOD PRESSURE: 78 MMHG | SYSTOLIC BLOOD PRESSURE: 181 MMHG | OXYGEN SATURATION: 96 % | RESPIRATION RATE: 15 BRPM | HEART RATE: 64 BPM

## 2022-05-28 DIAGNOSIS — Z95.0 PRESENCE OF CARDIAC PACEMAKER: ICD-10-CM

## 2022-05-28 DIAGNOSIS — H26.9 UNSPECIFIED CATARACT: ICD-10-CM

## 2022-05-28 DIAGNOSIS — I48.92 UNSPECIFIED ATRIAL FLUTTER: ICD-10-CM

## 2022-05-28 DIAGNOSIS — J44.9 CHRONIC OBSTRUCTIVE PULMONARY DISEASE, UNSPECIFIED: ICD-10-CM

## 2022-05-28 DIAGNOSIS — M10.9 GOUT, UNSPECIFIED: ICD-10-CM

## 2022-05-28 DIAGNOSIS — Z86.79 PERSONAL HISTORY OF OTHER DISEASES OF THE CIRCULATORY SYSTEM: ICD-10-CM

## 2022-05-28 LAB
ANION GAP SERPL CALC-SCNC: 18 MMOL/L — HIGH (ref 5–17)
BUN SERPL-MCNC: 69 MG/DL — HIGH (ref 7–23)
CALCIUM SERPL-MCNC: 8.6 MG/DL — SIGNIFICANT CHANGE UP (ref 8.4–10.5)
CHLORIDE SERPL-SCNC: 95 MMOL/L — LOW (ref 96–108)
CO2 SERPL-SCNC: 28 MMOL/L — SIGNIFICANT CHANGE UP (ref 22–31)
CREAT SERPL-MCNC: 11.56 MG/DL — HIGH (ref 0.5–1.3)
EGFR: 4 ML/MIN/1.73M2 — LOW
GLUCOSE SERPL-MCNC: 92 MG/DL — SIGNIFICANT CHANGE UP (ref 70–99)
HBV SURFACE AB SER-ACNC: SIGNIFICANT CHANGE UP
HBV SURFACE AG SER-ACNC: SIGNIFICANT CHANGE UP
HCT VFR BLD CALC: 44.3 % — SIGNIFICANT CHANGE UP (ref 39–50)
HCV AB S/CO SERPL IA: 0.06 S/CO — SIGNIFICANT CHANGE UP
HCV AB SERPL-IMP: SIGNIFICANT CHANGE UP
HGB BLD-MCNC: 14 G/DL — SIGNIFICANT CHANGE UP (ref 13–17)
MAGNESIUM SERPL-MCNC: 2 MG/DL — SIGNIFICANT CHANGE UP (ref 1.6–2.6)
MCHC RBC-ENTMCNC: 28.2 PG — SIGNIFICANT CHANGE UP (ref 27–34)
MCHC RBC-ENTMCNC: 31.6 GM/DL — LOW (ref 32–36)
MCV RBC AUTO: 89.3 FL — SIGNIFICANT CHANGE UP (ref 80–100)
NRBC # BLD: 0 /100 WBCS — SIGNIFICANT CHANGE UP (ref 0–0)
PHOSPHATE SERPL-MCNC: 5.7 MG/DL — HIGH (ref 2.5–4.5)
PLATELET # BLD AUTO: 231 K/UL — SIGNIFICANT CHANGE UP (ref 150–400)
POTASSIUM SERPL-MCNC: 5.2 MMOL/L — SIGNIFICANT CHANGE UP (ref 3.5–5.3)
POTASSIUM SERPL-SCNC: 5.2 MMOL/L — SIGNIFICANT CHANGE UP (ref 3.5–5.3)
RBC # BLD: 4.96 M/UL — SIGNIFICANT CHANGE UP (ref 4.2–5.8)
RBC # FLD: 16.1 % — HIGH (ref 10.3–14.5)
SODIUM SERPL-SCNC: 141 MMOL/L — SIGNIFICANT CHANGE UP (ref 135–145)
WBC # BLD: 8.94 K/UL — SIGNIFICANT CHANGE UP (ref 3.8–10.5)
WBC # FLD AUTO: 8.94 K/UL — SIGNIFICANT CHANGE UP (ref 3.8–10.5)

## 2022-05-28 PROCEDURE — C1889: CPT

## 2022-05-28 PROCEDURE — 87635 SARS-COV-2 COVID-19 AMP PRB: CPT

## 2022-05-28 PROCEDURE — 82947 ASSAY GLUCOSE BLOOD QUANT: CPT

## 2022-05-28 PROCEDURE — 90935 HEMODIALYSIS ONE EVALUATION: CPT

## 2022-05-28 PROCEDURE — 83735 ASSAY OF MAGNESIUM: CPT

## 2022-05-28 PROCEDURE — 99285 EMERGENCY DEPT VISIT HI MDM: CPT | Mod: 25

## 2022-05-28 PROCEDURE — 85014 HEMATOCRIT: CPT

## 2022-05-28 PROCEDURE — 80048 BASIC METABOLIC PNL TOTAL CA: CPT

## 2022-05-28 PROCEDURE — 36415 COLL VENOUS BLD VENIPUNCTURE: CPT

## 2022-05-28 PROCEDURE — 94640 AIRWAY INHALATION TREATMENT: CPT

## 2022-05-28 PROCEDURE — 87340 HEPATITIS B SURFACE AG IA: CPT

## 2022-05-28 PROCEDURE — 85027 COMPLETE CBC AUTOMATED: CPT

## 2022-05-28 PROCEDURE — 71046 X-RAY EXAM CHEST 2 VIEWS: CPT

## 2022-05-28 PROCEDURE — 85610 PROTHROMBIN TIME: CPT

## 2022-05-28 PROCEDURE — 99232 SBSQ HOSP IP/OBS MODERATE 35: CPT | Mod: GC

## 2022-05-28 PROCEDURE — 86850 RBC ANTIBODY SCREEN: CPT

## 2022-05-28 PROCEDURE — 86803 HEPATITIS C AB TEST: CPT

## 2022-05-28 PROCEDURE — 86900 BLOOD TYPING SEROLOGIC ABO: CPT

## 2022-05-28 PROCEDURE — 84132 ASSAY OF SERUM POTASSIUM: CPT

## 2022-05-28 PROCEDURE — 99233 SBSQ HOSP IP/OBS HIGH 50: CPT

## 2022-05-28 PROCEDURE — 82330 ASSAY OF CALCIUM: CPT

## 2022-05-28 PROCEDURE — 82803 BLOOD GASES ANY COMBINATION: CPT

## 2022-05-28 PROCEDURE — 84295 ASSAY OF SERUM SODIUM: CPT

## 2022-05-28 PROCEDURE — 85025 COMPLETE CBC W/AUTO DIFF WBC: CPT

## 2022-05-28 PROCEDURE — 86901 BLOOD TYPING SEROLOGIC RH(D): CPT

## 2022-05-28 PROCEDURE — 80053 COMPREHEN METABOLIC PANEL: CPT

## 2022-05-28 PROCEDURE — 93005 ELECTROCARDIOGRAM TRACING: CPT

## 2022-05-28 PROCEDURE — 82962 GLUCOSE BLOOD TEST: CPT

## 2022-05-28 PROCEDURE — 86706 HEP B SURFACE ANTIBODY: CPT

## 2022-05-28 PROCEDURE — 84100 ASSAY OF PHOSPHORUS: CPT

## 2022-05-28 PROCEDURE — C1768: CPT

## 2022-05-28 PROCEDURE — 85730 THROMBOPLASTIN TIME PARTIAL: CPT

## 2022-05-28 RX ORDER — LABETALOL HCL 100 MG
5 TABLET ORAL ONCE
Refills: 0 | Status: DISCONTINUED | OUTPATIENT
Start: 2022-05-28 | End: 2022-05-28

## 2022-05-28 RX ADMIN — Medication 1 MILLIGRAM(S): at 14:07

## 2022-05-28 RX ADMIN — Medication 1334 MILLIGRAM(S): at 07:02

## 2022-05-28 RX ADMIN — Medication 100 MILLIGRAM(S): at 15:01

## 2022-05-28 RX ADMIN — BUDESONIDE AND FORMOTEROL FUMARATE DIHYDRATE 2 PUFF(S): 160; 4.5 AEROSOL RESPIRATORY (INHALATION) at 05:22

## 2022-05-28 RX ADMIN — Medication 81 MILLIGRAM(S): at 14:07

## 2022-05-28 RX ADMIN — PREGABALIN 1000 MICROGRAM(S): 225 CAPSULE ORAL at 14:07

## 2022-05-28 RX ADMIN — Medication 50 MILLIGRAM(S): at 05:20

## 2022-05-28 RX ADMIN — Medication 203.76 MILLIGRAM(S): at 14:08

## 2022-05-28 RX ADMIN — HEPARIN SODIUM 5000 UNIT(S): 5000 INJECTION INTRAVENOUS; SUBCUTANEOUS at 05:21

## 2022-05-28 RX ADMIN — Medication 90 MILLIGRAM(S): at 05:21

## 2022-05-28 NOTE — DISCHARGE NOTE NURSING/CASE MANAGEMENT/SOCIAL WORK - PATIENT PORTAL LINK FT
You can access the FollowMyHealth Patient Portal offered by [facility] by registering at the following website: [email or web address]. By joining [employer]’s FollowMyHealth portal, you will also be able to view your health information using other applications (apps) compatible with our system.

## 2022-05-28 NOTE — DISCHARGE NOTE PROVIDER - CARE PROVIDER_API CALL
Parth Stauffer)  Surgery; Vascular Surgery  [address], [address] [place] [address]  Phone: [phone]  Fax: [phone]  Follow Up Time: 2 weeks

## 2022-05-28 NOTE — DISCHARGE NOTE NURSING/CASE MANAGEMENT/SOCIAL WORK - NSCORESITESY/N_GEN_A_CORE_RD
Gen: Alert, NAD, well appearing                  Head: NC, AT, PERRL, EOMI, normal lids/conjunctiva               ENT: normal hearing, patent oropharynx without erythema/exudate, uvula midline, moist mucosa                         Neck: supple, no tenderness/meningismus/JVD, trachea midline                   Pulm: Bilateral BS, normal resp effort, no wheeze/stridor/retractions/rales/rhonchi                      CV: RRR, no M/R/G, good dist pulses                Abd: soft, NT/ND, no hepatosplenomegaly                   Mskel: no edema/erythema/cyanosis                Skin: no rash                 Neuro: AAOx3, no sensory/motor deficits                       Back: No tenderness
No

## 2022-05-28 NOTE — PROGRESS NOTE ADULT - SUBJECTIVE AND OBJECTIVE BOX
Patient is a 77y old  Male who presents with a chief complaint of     INTERVAL HPI/OVERNIGHT EVENTS:    Pt. seen and examined earlier today  Pt. feels well, has no complaints    Review of Systems: 12 point review of systems otherwise negative    MEDICATIONS  (STANDING):  aspirin enteric coated 81 milliGRAM(s) Oral daily  atorvastatin 20 milliGRAM(s) Oral at bedtime  budesonide  80 MICROgram(s)/formoterol 4.5 MICROgram(s) Inhaler 2 Puff(s) Inhalation two times a day  calcium acetate 1334 milliGRAM(s) Oral two times a day with meals  cyanocobalamin 1000 MICROGram(s) Oral daily  folic acid 1 milliGRAM(s) Oral daily  gentamicin   IVPB 75 milliGRAM(s) IV Intermittent <User Schedule>  heparin   Injectable 5000 Unit(s) SubCutaneous every 12 hours  metoprolol succinate ER 50 milliGRAM(s) Oral daily  NIFEdipine XL 90 milliGRAM(s) Oral daily  silver sulfADIAZINE 1% Cream 1 Application(s) Topical daily  tiotropium 18 MICROgram(s) Capsule 1 Capsule(s) Inhalation daily  vancomycin  IVPB 500 milliGRAM(s) IV Intermittent <User Schedule>    MEDICATIONS  (PRN):      Allergies    Seafood (Other)  Vasotec (Other)    Intolerances          Vital Signs Last 24 Hrs  T(C): 36 (28 May 2022 14:02), Max: 36.6 (28 May 2022 10:15)  T(F): 96.8 (28 May 2022 14:02), Max: 97.8 (28 May 2022 10:15)  HR: 64 (28 May 2022 14:56) (54 - 67)  BP: 181/78 (28 May 2022 14:56) (117/59 - 185/81)  BP(mean): 112 (28 May 2022 14:56) (83 - 117)  RR: 15 (28 May 2022 14:56) (15 - 22)  SpO2: 96% (28 May 2022 14:56) (94% - 98%)  CAPILLARY BLOOD GLUCOSE      POCT Blood Glucose.: 95 mg/dL (27 May 2022 21:50)  POCT Blood Glucose.: 126 mg/dL (27 May 2022 16:48)      - @ 07:01  -   @ 07:00  --------------------------------------------------------  IN: 800 mL / OUT: 0 mL / NET: 800 mL        Physical Exam:  (earlier today)  Daily     Daily Weight in k.9 (28 May 2022 10:15)  General:  well-appearing in NAD, sitting in a chair  HEENT:  MMM  CV:  RRR  Lungs:  CTA B/L  Abdomen:  soft NT ND  Extremities:  +LUE AVF  Skin:  WWP  Neuro:  AAOx3    LABS:                        14.0   8.94  )-----------( 231      ( 28 May 2022 07:19 )             44.3         141  |  95<L>  |  69<H>  ----------------------------<  92  5.2   |  28  |  11.56<H>    Ca    8.6      28 May 2022 07:18  Phos  5.7       Mg     2.0         TPro  7.8  /  Alb  4.9  /  TBili  0.3  /  DBili  x   /  AST  15  /  ALT  9<L>  /  AlkPhos  99      PT/INR - ( 26 May 2022 16:02 )   PT: 12.6 sec;   INR: 1.06          PTT - ( 26 May 2022 16:02 )  PTT:29.8 sec

## 2022-05-28 NOTE — DISCHARGE NOTE PROVIDER - NSDCCPCAREPLAN_GEN_ALL_CORE_FT
PRINCIPAL DISCHARGE DIAGNOSIS  Diagnosis: AV fistula  Assessment and Plan of Treatment:       SECONDARY DISCHARGE DIAGNOSES  Diagnosis: ESRD on dialysis  Assessment and Plan of Treatment:     Diagnosis: Essential hypertension  Assessment and Plan of Treatment:     Diagnosis: CAD (coronary artery disease)  Assessment and Plan of Treatment:     Diagnosis: Presence of permanent cardiac pacemaker  Assessment and Plan of Treatment:     Diagnosis: Complication of AV dialysis fistula  Assessment and Plan of Treatment:     Diagnosis: Paroxysmal atrial flutter  Assessment and Plan of Treatment:     Diagnosis: Gout  Assessment and Plan of Treatment:     Diagnosis: COPD, mild  Assessment and Plan of Treatment:     Diagnosis: History of first degree AV block  Assessment and Plan of Treatment:     Diagnosis: Right cataract  Assessment and Plan of Treatment:

## 2022-05-28 NOTE — DISCHARGE NOTE PROVIDER - HOSPITAL COURSE
77M w/ PMHx CAD/MI s/p cath 2018 s/p PPM 4/22, 1st AV block, sinus marge, pAFlutter, HTN, HLD, Pulm HTN, ESRD on MWF HD, gout, venous insufficiency, COPD, scoliosis, R cataract surgery, LUE AVF sent in from Dr. Stauffer's office for AVF ulceration today. Pt is in his usual state of health and notes he had a regular session of dialysis yesterday. He went to his regular office visit w/ Dr. 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 Denies f/c. Denies LUE swelling, n/t/w. Denies arm pain or discomfort. Still feels pulse and thrill in AVF.    On 5/27 pt went to the OR for LUE AVF revision with ligation of aneurysmal venous tract, and interposition graft with Accuseal graft. Pt tolerated the procedure well and without complication. Pt received HD the next day through the graft. Today the patient is feeling well, all the VS and blood work is within normal limits.  The pain is well controlled on oral pain medication.  The patient is tolerating diet without nausea, and ambulating as tolerated. Patient is stable and ready for discharge today.

## 2022-05-28 NOTE — PROGRESS NOTE ADULT - SUBJECTIVE AND OBJECTIVE BOX
--------------------------------------------------------------------------------  Chief Complaint: ESRD/Ongoing hemodialysis requirement    24 hour events/subjective:  Seen this AM, remains stable.   Labs noted Hgb 14, Plt 231, K 5.2      PAST HISTORY  --------------------------------------------------------------------------------  No significant changes to PMH, PSH, FHx, SHx, unless otherwise noted    ALLERGIES & MEDICATIONS  --------------------------------------------------------------------------------  Allergies    Seafood (Other)  Vasotec (Other)    Intolerances      Standing Inpatient Medications  aspirin enteric coated 81 milliGRAM(s) Oral daily  atorvastatin 20 milliGRAM(s) Oral at bedtime  budesonide  80 MICROgram(s)/formoterol 4.5 MICROgram(s) Inhaler 2 Puff(s) Inhalation two times a day  calcium acetate 1334 milliGRAM(s) Oral two times a day with meals  cyanocobalamin 1000 MICROGram(s) Oral daily  folic acid 1 milliGRAM(s) Oral daily  gentamicin   IVPB 75 milliGRAM(s) IV Intermittent <User Schedule>  heparin   Injectable 5000 Unit(s) SubCutaneous every 12 hours  metoprolol succinate ER 50 milliGRAM(s) Oral daily  NIFEdipine XL 90 milliGRAM(s) Oral daily  silver sulfADIAZINE 1% Cream 1 Application(s) Topical daily  tiotropium 18 MICROgram(s) Capsule 1 Capsule(s) Inhalation daily  vancomycin  IVPB 500 milliGRAM(s) IV Intermittent <User Schedule>    PRN Inpatient Medications      REVIEW OF SYSTEMS  --------------------------------------------------------------------------------  ROS negative except as per HPI    VITALS/PHYSICAL EXAM  --------------------------------------------------------------------------------  T(C): 36.6 (22 @ 10:15), Max: 36.6 (22 @ 10:15)  HR: 57 (22 @ 10:15) (54 - 64)  BP: 144/65 (22 @ 10:15) (117/59 - 167/74)  RR: 18 (22 @ 10:15) (16 - 24)  SpO2: 97% (22 @ 10:15) (94% - 98%)  Wt(kg): --  Drug Dosing Weight  Height (cm): 165.1 (26 May 2022 15:33)  Weight (kg): 73.5 (26 May 2022 15:33)  BMI (kg/m2): 27 (26 May 2022 15:33)  BSA (m2): 1.81 (26 May 2022 15:33)  Height (cm): 165.1 (22 @ 15:33)  Weight (kg): 73.5 (22 @ 15:33)  BMI (kg/m2): 27 (22 15:33)  BSA (m2): 1.81 (22 @ 15:33)      22 @ 07:01  -  22 @ 07:00  --------------------------------------------------------  IN: 800 mL / OUT: 0 mL / NET: 800 mL    PHYSICAL EXAM:  GENERAL: Alert, awake, oriented x3 on RA   HEENT: JAKY, EOMI, neck supple, no JVP  CHEST/LUNG: Bilateral clear breath sounds  HEART: Regular rate and rhythm, no murmur, no gallops, no rub   ABDOMEN: Soft, nontender, non distended  : No flank or supra pubic tenderness.  EXTREMITIES: no pedal edema  Neurology: AAOx3, no asterixis   SKIN: No rash or skin lesion   ACCESS: MARSHALL JONES w/bruit and thrill    LABS/STUDIES  --------------------------------------------------------------------------------              14.0   8.94  >-----------<  231      [22 @ 07:19]              44.3     141  |  95  |  69  ----------------------------<  92      [22 @ 07:18]  5.2   |  28  |  11.56        Ca     8.6     [22 @ 07:18]      Mg     2.0     [22 @ 07:18]      Phos  5.7     [22 @ 07:18]    TPro  7.8  /  Alb  4.9  /  TBili  0.3  /  DBili  x   /  AST  15  /  ALT  9   /  AlkPhos  99  [22 @ 16:02]    PT/INR: PT 12.6 , INR 1.06       [22 @ 16:02]  PTT: 29.8       [22 @ 16:02]        HBsAg Nonreact      [22 @ 10:25]  HCV 0.06, Nonreact      [22 @ 10:25]      RADIOLOGY:  --------------------------------------------------------------------------------------  Hemoglobin: 14.0 g/dL (22 @ 07:19)  Phosphorus Level, Serum: 5.7 mg/dL (22 @ 07:18)  Hemoglobin: 14.2 g/dL (22 @ 22:42)  Phosphorus Level, Serum: 4.7 mg/dL (22 @ 22:42)    Hepatitis B Surface Antigen: Nonreact (22 @ 10:25)  Albumin, Serum: 4.9 g/dL (22 @ 16:02)    calcium acetate 1334 milliGRAM(s) Oral two times a day with meals, 22 @ 16:47, Routine  calcium acetate 1334 milliGRAM(s) Oral two times a day with meals, 22 @ 12:19,       Hemodialysis Treatment.:     Schedule: Once, Modality: Hemodialysis, Access: Arteriovenous Graft    Dialyzer: Optiflux [id number], Time: 180 Min    Blood Flow: 400 mL/Min , Dialysate Flow: 500 mL/Min, Dialysate Temp: 36.5, Tubinmm (Adult)    Target Fluid Removal: 2 Liters    Dialysate Electrolytes (mEq/L): Potassium 2, Calcium 2.5, Sodium 138, Bicarbonate 35    Additional Instructions: Patient has new access site put in by vascular, can use one needle proximal and one distal (into the graft) per vascular. (22 @ 07:38) [Completed]    Seen on HD; tolerating well. C/w prescription as outlined above

## 2022-05-28 NOTE — DISCHARGE NOTE NURSING/CASE MANAGEMENT/SOCIAL WORK - NSDCPEFALRISK_GEN_ALL_CORE
For information on Fall & Injury Prevention, visit: [email or web address] OR  [email or web address] OR  https://www.cdc.gov/steadi/patient.html

## 2022-05-28 NOTE — DISCHARGE NOTE PROVIDER - NSDCFUADDINST_GEN_ALL_CORE_FT
FOLLOW UP: Dr. Stauffer in 2 weeks. Please call the office to schedule your appointment and with any further questions.  WOUND CARE: You may shower; soap and water over incision sites. Do not scrub. Pat dry when done.   ACTIVITY: Ambulate as tolerated, but no heavy lifting (>10lbs) or strenuous exercise.  DIET: You may resume regular diet. Call the office if you experience increasing pain, redness, swelling or drainage from incision sites/wounds, or temperature >101.4F.   NEW MEDICATIONS: None  ADDITIONAL FOLLOW UP AFTER DISCHARGE: Nephrologist  DISCHARGE DESTINATION: Home FOLLOW UP: Dr. Stauffer in 2 weeks. Please call the office to schedule your appointment and with any further questions.  WOUND CARE: You may shower; soap and water over incision sites. Do not scrub. Pat dry when done.   ACTIVITY: Ambulate as tolerated, but no heavy lifting (>10lbs) or strenuous exercise.  DIET: You may resume regular diet. Call the office if you experience increasing pain, redness, swelling or drainage from incision sites/wounds, or temperature >101.4F.   NEW MEDICATIONS: 500 Vancomycin and 1mg/kg Gentamicin after HD sessions for 2 weeks  ADDITIONAL FOLLOW UP AFTER DISCHARGE: Nephrologist  DISCHARGE DESTINATION: Home

## 2022-05-28 NOTE — PROGRESS NOTE ADULT - ASSESSMENT
77M PMHx CAD/MI s/p cath 2018 s/p PPM 4/22, 1st AV block, sinus marge, pAFlutter, HTN, HLD, Pulm HTN, ESRD on MWF HD, gout, venous insufficiency, COPD, scoliosis, R cataract surgery, LUE AVF sent in from Dr. Stauffer's office for AVF ulceration. Nephrology consulted for dialysis.     Assessment/Plan:   #ESRD on HD MWF @[facility]   usual Rx 3.5h unknown UF or eDW   -HD today in setting of deferred session on 5/27  -Can have next HD 5/30 OP.   -electrolytes at goal   -euvolemic, UF w/HD     #HTN   BP at goal   continue home meds     #access   LUE AVF      #anemia  Hb at goal   no indication for EPO or IV Iron at this time     #renal bone disease   acceptable     Thank you for the opportunity to participate in the care of your patient. The nephrology service remains available to assist with any questions or concerns. Please feel free to reach us by paging the on-call nephrology fellow for urgent issues or as below. 
76 y/o M w/

## 2022-05-28 NOTE — DISCHARGE NOTE PROVIDER - NSDCMRMEDTOKEN_GEN_ALL_CORE_FT
Aspir 81 oral delayed release tablet: 1 tab(s) orally once a day  atorvastatin 20 mg oral tablet: 1 tab(s) orally once a day  calcium acetate 667 mg oral tablet: 2 tab(s) orally 2 times a day  Eliquis 2.5 mg oral tablet: 1 tab(s) orally 2 times a day  folic acid 1 mg oral tablet: 1 tab(s) orally once a day  gentamicin 10 mg/mL injectable solution: 1 mg/kg intravenously Monday, Wednesday, and Friday after HD for 2 weeks from 5/27  metoprolol succinate 50 mg oral tablet, extended release: 1 tab(s) orally once a day  NIFEdipine 90 mg oral tablet, extended release: 1 tab(s) orally once a day  olmesartan 40 mg oral tablet: 1 tab(s) orally once a day  Silvadene 1% topical cream: Apply topically to affected area once a day   Trelegy Ellipta 100 mcg-62.5 mcg-25 mcg/inh inhalation powder: 1 puff(s) inhaled once a day  vancomycin 500 mg intravenous injection: 500 milligram(s) intravenously Monday, Wednesday, and Friday after HD from 5/27  Vitamin B-12 500 mcg oral tablet: 1 tab(s) orally once a day

## 2022-05-28 NOTE — PROGRESS NOTE ADULT - SUBJECTIVE AND OBJECTIVE BOX
O/N: POC wnl, STERLING, VSS  5/27:s/p L AVF revision,  renal on consult, vanco+gentamicin with HD MWF                                                    `    ---------------------------------------------------------------------------  PLEASE CHECK WHEN PRESENT:  [  ] Heart Failure  [  ] Acute  [  ] Acute on Chronic  [  ] Chronic  -------------------------------------------------------------------  [  ]Diastolic [HFpEF]  [  ]Systolic [HFrEF]  [  ]Combined [HFpEF & HFrEF]  [ x] aflutter  [x  ] hypertensive heart disease  [ x]Other: 1st degree AVB  -------------------------------------------------------------------  [ ] Respiratory failure  [ ] Acute cor pulmonale  [ ] Asthma/COPD Exacerbation  [ ] Pleural effusion  [ ] Aspiration pneumonia  -------------------------------------------------------------------  [  ]ZAMZAM  [  ]ATN  [  ]Reneal Medullary Necrosis  [  ]Renal Cortical Necrosis  [  ]Other Pathological Lesions:    [  ]CKD 1  [  ]CKD 2  [  ]CKD 3  [  ]CKD 4  [ x]CKD 5  [  ]Other  -------------------------------------------------------------------  [  ]Diabetes  [  ] Diabetic PVD Ulcer  [  ] Neuropathic ulcer to DM  [  ] Diabetes with Nephropathy  [  ] Osteomyelitis due to diabetes  --------------------------------------------------------------------  [  ]Malnutrition: See Nutrition Note  [  ]Cachexia  [  ]Other:   [  ]Supplement Ordered:  [  ]Morbid Obesity (BMI >=40]  ---------------------------------------------------------------------  [ ] Sepsis/severe sepsis/septic shock  [ ] UTI  [ ] Pneumonia  [x] COPD  [x] Pulmonary hypertension   -----------------------------------------------------------------------  [ ] Acidosis/alkalosis  [ ] Fluid overload  [ ] Hypokalemia  [ ] Hyperkalemia  [ ] Hypomagnesemia  [ ] Hypophosphatemia  [ ] Hyperphosphatemia  ------------------------------------------------------------------------  [ ] Acute blood loss anemia  [ ] Post op blood loss anemia  [ ] Iron deficiency anemia  [ ] Anemia due to chronic disease  [ ] Hypercoagulable state  ----------------------------------------------------------------------  [ ] Cerebral infarction  [ ] Transient ischemia attack  [ ] Encephalopathy      77M w/ CAD/MI s/p PPM, ESRD on MWF HD via LUE AVF who presents from Dr. Stauffer's office for LUE AVF revision w/ Dr. Oreilly.    Vascular/PAD:  Home Atorvastatin, ASA  Holding Eliquis erasmo-op    HTN/HLD:  Cont Metop, nifedipine  Holding Olmesarta    CAD/CHF:  Cont ASA    CKD:   HD MWF via LUE AVF  Renal consult    ID:  Brock w/ HD    Diet:  Renal diet  NPO@MN    Activity: As tolerated    DVTPPx:   SQH    Dispo: pending OR for LUE AVF revision today O/N: POC wnl, STERLING, VSS  5/27:s/p L AVF revision,  renal on consult, vanco+gentamicin with HD MWF    S. Doing well this AM. NAEON. Denies n/t/w, pain, or coolness in L arm or hand. Denies n/v. Denies cp/sob. Pain is well controlled. Ambulating as tolerated. Tolerating diet.    gentamicin   IVPB 75  vancomycin  IVPB 500  aspirin enteric coated 81  gentamicin   IVPB 75  heparin   Injectable 5000  metoprolol succinate ER 50  NIFEdipine XL 90  vancomycin  IVPB 500      Allergies    Seafood (Other)  Vasotec (Other)    Intolerances        Vital Signs Last 24 Hrs  T(C): 36.4 (28 May 2022 06:05), Max: 36.4 (28 May 2022 06:05)  T(F): 97.5 (28 May 2022 06:05), Max: 97.5 (28 May 2022 06:05)  HR: 63 (28 May 2022 08:26) (54 - 64)  BP: 156/69 (28 May 2022 08:26) (117/59 - 167/74)  BP(mean): 99 (28 May 2022 08:26) (83 - 104)  RR: 16 (28 May 2022 08:26) (16 - 24)  SpO2: 96% (28 May 2022 08:26) (94% - 98%)    Physical Exam:  GEN: NAD, resting comfortably in bed  HEENT: MMM  CV: RRR  Resp: nonlabored breathing, no respiratory distress  Abd: soft, nontender, nondistended  Ext: WWP, no edema, no calf tenderness  LUE: distal and proximal incisions clean/dry/intact, 2 pseudoaneurysms now decompressed s/p ligation are noted distal 2cm and proximal 1cm, ulceration noted over distal aneurysm, no active drainage/bleeding, good thrill palpable  Neuro: no focal deficits  Psych: pleasant    LABS:                        14.0   8.94  )-----------( 231      ( 28 May 2022 07:19 )             44.3     05-28    141  |  95<L>  |  69<H>  ----------------------------<  92  5.2   |  28  |  11.56<H>    Ca    8.6      28 May 2022 07:18  Phos  5.7     05-28  Mg     2.0     05-28    TPro  7.8  /  Alb  4.9  /  TBili  0.3  /  DBili  x   /  AST  15  /  ALT  9<L>  /  AlkPhos  99  05-26    PT/INR - ( 26 May 2022 16:02 )   PT: 12.6 sec;   INR: 1.06          PTT - ( 26 May 2022 16:02 )  PTT:29.8 sec      ---------------------------------------------------------------------------  PLEASE CHECK WHEN PRESENT:  [  ] Heart Failure  [  ] Acute  [  ] Acute on Chronic  [  ] Chronic  -------------------------------------------------------------------  [  ]Diastolic [HFpEF]  [  ]Systolic [HFrEF]  [  ]Combined [HFpEF & HFrEF]  [ x] aflutter  [x  ] hypertensive heart disease  [ x]Other: 1st degree AVB  -------------------------------------------------------------------  [ ] Respiratory failure  [ ] Acute cor pulmonale  [ ] Asthma/COPD Exacerbation  [ ] Pleural effusion  [ ] Aspiration pneumonia  -------------------------------------------------------------------  [  ]ZAMZAM  [  ]ATN  [  ]Reneal Medullary Necrosis  [  ]Renal Cortical Necrosis  [  ]Other Pathological Lesions:    [  ]CKD 1  [  ]CKD 2  [  ]CKD 3  [  ]CKD 4  [ x]CKD 5  [  ]Other  -------------------------------------------------------------------  [  ]Diabetes  [  ] Diabetic PVD Ulcer  [  ] Neuropathic ulcer to DM  [  ] Diabetes with Nephropathy  [  ] Osteomyelitis due to diabetes  --------------------------------------------------------------------  [  ]Malnutrition: See Nutrition Note  [  ]Cachexia  [  ]Other:   [  ]Supplement Ordered:  [  ]Morbid Obesity (BMI >=40]  ---------------------------------------------------------------------  [ ] Sepsis/severe sepsis/septic shock  [ ] UTI  [ ] Pneumonia  [x] COPD  [x] Pulmonary hypertension   -----------------------------------------------------------------------  [ ] Acidosis/alkalosis  [ ] Fluid overload  [ ] Hypokalemia  [ ] Hyperkalemia  [ ] Hypomagnesemia  [ ] Hypophosphatemia  [ ] Hyperphosphatemia  ------------------------------------------------------------------------  [ ] Acute blood loss anemia  [ ] Post op blood loss anemia  [ ] Iron deficiency anemia  [ ] Anemia due to chronic disease  [ ] Hypercoagulable state  ----------------------------------------------------------------------  [ ] Cerebral infarction  [ ] Transient ischemia attack  [ ] Encephalopathy      77M w/ CAD/MI s/p PPM, ESRD on MWF HD via LUE AVF who presents from Dr. Stauffer's office for LUE AVF revision w/ Dr. Oreilly.    Vascular/PAD:  Home Atorvastatin, ASA  Holding Eliquis erasmo-op    HTN/HLD:  Cont Metop, nifedipine  Holding Olmesartan    CAD/CHF:  Cont ASA    CKD:   HD MWF via LUE AVF  Renal consult    ID:  Brock w/ HD  Silvadene topical    Diet:  Renal diet    Activity: As tolerated    DVTPPx:   SQH    Dispo: Home after HD

## 2022-06-03 DIAGNOSIS — I27.20 PULMONARY HYPERTENSION, UNSPECIFIED: ICD-10-CM

## 2022-06-03 DIAGNOSIS — I73.9 PERIPHERAL VASCULAR DISEASE, UNSPECIFIED: ICD-10-CM

## 2022-06-03 DIAGNOSIS — D64.9 ANEMIA, UNSPECIFIED: ICD-10-CM

## 2022-06-03 DIAGNOSIS — N18.6 END STAGE RENAL DISEASE: ICD-10-CM

## 2022-06-03 DIAGNOSIS — L98.491 NON-PRESSURE CHRONIC ULCER OF SKIN OF OTHER SITES LIMITED TO BREAKDOWN OF SKIN: ICD-10-CM

## 2022-06-03 DIAGNOSIS — T82.510A BREAKDOWN (MECHANICAL) OF SURGICALLY CREATED ARTERIOVENOUS FISTULA, INITIAL ENCOUNTER: ICD-10-CM

## 2022-06-03 DIAGNOSIS — I48.92 UNSPECIFIED ATRIAL FLUTTER: ICD-10-CM

## 2022-06-03 DIAGNOSIS — J44.9 CHRONIC OBSTRUCTIVE PULMONARY DISEASE, UNSPECIFIED: ICD-10-CM

## 2022-06-03 DIAGNOSIS — I25.2 OLD MYOCARDIAL INFARCTION: ICD-10-CM

## 2022-06-03 DIAGNOSIS — Z99.2 DEPENDENCE ON RENAL DIALYSIS: ICD-10-CM
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
## 2022-06-03 DIAGNOSIS — Z95.0 PRESENCE OF CARDIAC PACEMAKER: ICD-10-CM

## 2022-06-03 DIAGNOSIS — M10.9 GOUT, UNSPECIFIED: ICD-10-CM

## 2022-06-03 DIAGNOSIS — Z91.013 ALLERGY TO SEAFOOD: ICD-10-CM

## 2022-06-03 DIAGNOSIS — I44.0 ATRIOVENTRICULAR BLOCK, FIRST DEGREE: ICD-10-CM

## 2022-06-03 DIAGNOSIS — I25.10 ATHEROSCLEROTIC HEART DISEASE OF NATIVE CORONARY ARTERY WITHOUT ANGINA PECTORIS: ICD-10-CM

## 2022-06-03 DIAGNOSIS — E78.5 HYPERLIPIDEMIA, UNSPECIFIED: ICD-10-CM

## 2022-06-03 DIAGNOSIS — H26.9 UNSPECIFIED CATARACT: ICD-10-CM

## 2022-06-03 DIAGNOSIS — I12.0 HYPERTENSIVE CHRONIC KIDNEY DISEASE WITH STAGE 5 CHRONIC KIDNEY DISEASE OR END STAGE RENAL DISEASE: ICD-10-CM

## 2022-06-07 LAB
ISTAT VENOUS BE: 5 MMOL/L — HIGH (ref -2–3)
ISTAT VENOUS GLUCOSE: 77 MG/DL — SIGNIFICANT CHANGE UP (ref 70–99)
ISTAT VENOUS HCO3: 32 MMOL/L — HIGH (ref 23–28)
ISTAT VENOUS HEMATOCRIT: 46 % — SIGNIFICANT CHANGE UP (ref 39–50)
ISTAT VENOUS HEMOGLOBIN: 15.6 GM/DL — SIGNIFICANT CHANGE UP (ref 13–17)
ISTAT VENOUS IONIZED CALCIUM: 0.96 MMOL/L — LOW (ref 1.12–1.3)
ISTAT VENOUS PCO2: 56 MMHG — HIGH (ref 41–51)
ISTAT VENOUS PH: 7.37 — SIGNIFICANT CHANGE UP (ref 7.31–7.41)
ISTAT VENOUS PO2: <66 MMHG — LOW (ref 35–40)
ISTAT VENOUS POTASSIUM: 4.7 MMOL/L — SIGNIFICANT CHANGE UP (ref 3.5–5.3)
ISTAT VENOUS SO2: 30 % — SIGNIFICANT CHANGE UP
ISTAT VENOUS SODIUM: 136 MMOL/L — SIGNIFICANT CHANGE UP (ref 135–145)
ISTAT VENOUS TCO2: 34 MMOL/L — HIGH (ref 22–31)
